# Patient Record
Sex: FEMALE | Race: WHITE
[De-identification: names, ages, dates, MRNs, and addresses within clinical notes are randomized per-mention and may not be internally consistent; named-entity substitution may affect disease eponyms.]

---

## 2019-07-13 ENCOUNTER — HOSPITAL ENCOUNTER (OUTPATIENT)
Dept: HOSPITAL 53 - M SLEEP | Age: 74
End: 2019-07-13
Attending: SPECIALIST
Payer: MEDICARE

## 2019-07-13 DIAGNOSIS — G47.61: ICD-10-CM

## 2019-07-13 DIAGNOSIS — G47.33: Primary | ICD-10-CM

## 2019-07-16 NOTE — SLEEPCENT
DATE OF PROCEDURE:  07/13/2019

 

ORDERED BY:  Ashley Fuentes MD

 

Nocturnal polysomnography was performed for evaluation of sleep physiology in

this patient suspected of excess of obstructive sleep apnea syndrome.

 

8 hours and 22 minutes of data were reviewed.  They were 391 minutes of sleep

identified.  Sleep latency was prolonged at 47.5 minutes.  Rapid eye movement

(REM) latency was prolonged 379 minutes.  Sleep architecture showed fragmentation

and poor progression.  There was 1 REM cycle late in the study.  Overall sleep

efficiency 78.9%.  The electrocardiogram shows sinus rhythm with an average heart

rate of 60 beats per minute.  Rate ranged 52-74.  Electroencephalogram (EEG)

showed normal waveforms for awake and sleep.  No focal events were identified.

There were 297 respiratory events identified of 10 seconds in duration or greater

for an apnea-hypopnea index of 45.5.  The events were obstructive, not exclusive

to sleep stage nor body posture.  Arousals from respiratory events occurred 42.8

times per hour and oxygen desaturations were seen into the low 80s.  There was

significant limb activity noted as well with a limb movement arousal index of

22.4.

 

IMPRESSION:

1.  Severe obstructive sleep apnea syndrome (G47.33).  Apnea-hypopnea index

45.5.

2.  Periodic limb movement disorder (G47.61).  Limb movement arousal index of

22.4.

 

RECOMMENDATIONS:  The patient should be encouraged to return to the sleep

disorder center for pressure therapy at her earliest convenience.  In the

interim, alcohol and sedative avoidance should be practiced and caution exercised

during the operation of motor vehicles.  Pending response to pressure therapy,

interventions to reduce the frequency for arousals from limb activity may also be

needed.

## 2019-08-31 ENCOUNTER — HOSPITAL ENCOUNTER (OUTPATIENT)
Dept: HOSPITAL 53 - M SLEEP | Age: 74
End: 2019-08-31
Attending: SPECIALIST
Payer: MEDICARE

## 2019-08-31 DIAGNOSIS — G47.33: Primary | ICD-10-CM

## 2019-09-04 NOTE — SLEEPCENT
DATE OF PROCEDURE:  08/31/2019

 

Ordered by: Ashley Fuentes MD

 

Nocturnal polysomnography was performed for the titration of pressure therapy in

this patient with obstructive sleep apnea syndrome.  Apnea-hypopnea index 45.5.

 

For testing, the patient was fit with a [employer] full-face mask of

small size, 4 cm of water pressure were applied to the circuit and the lights

were extinguished.

 

8 hours and 8 minutes of data were reviewed.  There were 436 minutes of sleep

identified.  Sleep latency was short at 19 minutes.  REM latency was normal at

125 minutes.  Sleep architecture improved with pressure therapy.  Overall sleep

efficiency was 91.1%.  The electrocardiogram showed a sinus rhythm with an

average heart rate of 58 beats per minute.  EEG showed normal waveforms for awake

and sleep.  Respiratory events were best palliated with C-PAP at a pressure +11.

There was significant persistent limb activity noted in the EMG leads.  Limb

movement arousal index is 7.8.

 

IMPRESSION

Obstructive sleep apnea syndrome (G47.33)

 

RECOMMENDATIONS

Nightly use of pressure therapy 11 cm of water.

## 2019-11-08 ENCOUNTER — HOSPITAL ENCOUNTER (OUTPATIENT)
Dept: HOSPITAL 53 - M LAB REF | Age: 74
End: 2019-11-08
Attending: SPECIALIST
Payer: MEDICARE

## 2019-11-08 DIAGNOSIS — M06.9: Primary | ICD-10-CM

## 2019-11-08 LAB
ALBUMIN SERPL BCG-MCNC: 3.4 GM/DL (ref 3.2–5.2)
ALT SERPL W P-5'-P-CCNC: 20 U/L (ref 12–78)
BASOPHILS # BLD AUTO: 0.1 10^3/UL (ref 0–0.2)
BASOPHILS NFR BLD AUTO: 1.3 % (ref 0–1)
BILIRUB SERPL-MCNC: 0.3 MG/DL (ref 0.2–1)
BUN SERPL-MCNC: 20 MG/DL (ref 7–18)
CALCIUM SERPL-MCNC: 9.3 MG/DL (ref 8.8–10.2)
CHLORIDE SERPL-SCNC: 108 MEQ/L (ref 98–107)
CO2 SERPL-SCNC: 30 MEQ/L (ref 21–32)
CREAT SERPL-MCNC: 1.07 MG/DL (ref 0.55–1.3)
EOSINOPHIL # BLD AUTO: 0.4 10^3/UL (ref 0–0.5)
EOSINOPHIL NFR BLD AUTO: 4.9 % (ref 0–3)
GFR SERPL CREATININE-BSD FRML MDRD: 53.1 ML/MIN/{1.73_M2} (ref 39–?)
GLUCOSE SERPL-MCNC: 89 MG/DL (ref 70–100)
HCT VFR BLD AUTO: 36 % (ref 36–47)
HGB BLD-MCNC: 11.2 G/DL (ref 12–15.5)
LYMPHOCYTES # BLD AUTO: 1.4 10^3/UL (ref 1.5–5)
LYMPHOCYTES NFR BLD AUTO: 17.9 % (ref 24–44)
MCH RBC QN AUTO: 29.7 PG (ref 27–33)
MCHC RBC AUTO-ENTMCNC: 31.1 G/DL (ref 32–36.5)
MCV RBC AUTO: 95.5 FL (ref 80–96)
MONOCYTES # BLD AUTO: 0.9 10^3/UL (ref 0–0.8)
MONOCYTES NFR BLD AUTO: 11.7 % (ref 0–5)
NEUTROPHILS # BLD AUTO: 4.9 10^3/UL (ref 1.5–8.5)
NEUTROPHILS NFR BLD AUTO: 63.8 % (ref 36–66)
NT-PRO BNP: 406 PG/ML (ref ?–450)
PLATELET # BLD AUTO: 356 10^3/UL (ref 150–450)
POTASSIUM SERPL-SCNC: 5 MEQ/L (ref 3.5–5.1)
PROT SERPL-MCNC: 7.2 GM/DL (ref 6.4–8.2)
RBC # BLD AUTO: 3.77 10^6/UL (ref 4–5.4)
SODIUM SERPL-SCNC: 141 MEQ/L (ref 136–145)
WBC # BLD AUTO: 7.7 10^3/UL (ref 4–10)